# Patient Record
Sex: FEMALE | URBAN - METROPOLITAN AREA
[De-identification: names, ages, dates, MRNs, and addresses within clinical notes are randomized per-mention and may not be internally consistent; named-entity substitution may affect disease eponyms.]

---

## 2020-04-03 ENCOUNTER — TELEPHONE (OUTPATIENT)
Dept: TRANSPLANT | Facility: CLINIC | Age: 75
End: 2020-04-03

## 2020-04-03 NOTE — TELEPHONE ENCOUNTER
multiple calls made to office, one line busy and one line a fax.  Call made to marva siegel on [phone], call to [phone] spoke with pt, informed I have been unable to contact the referring. She will attempt to get all imaging reports from 2/2019 first dx HCC to present.  Pt informed we will discuss her case for 2nd opinion on Tuesday.

## 2020-04-03 NOTE — TELEPHONE ENCOUNTER
----- Message from Chace Hamilton sent at 4/3/2020 11:00 AM CDT -----  Contact: Pt  Pt is calling to speak with Theodora.    Pt# [phone]

## 2020-04-08 ENCOUNTER — TELEPHONE (OUTPATIENT)
Dept: TRANSPLANT | Facility: CLINIC | Age: 75
End: 2020-04-08

## 2020-04-08 NOTE — TELEPHONE ENCOUNTER
Pt called informed of committee discussion. Pt is not a candidate for liver transplant given altered anatomy and calcification of aorta, our team did not feel like she would be a good candidate for transplant.